# Patient Record
Sex: FEMALE | Race: AMERICAN INDIAN OR ALASKA NATIVE
[De-identification: names, ages, dates, MRNs, and addresses within clinical notes are randomized per-mention and may not be internally consistent; named-entity substitution may affect disease eponyms.]

---

## 2018-12-11 ENCOUNTER — HOSPITAL ENCOUNTER (EMERGENCY)
Dept: HOSPITAL 31 - C.ER | Age: 78
Discharge: HOME | End: 2018-12-11
Payer: MEDICARE

## 2018-12-11 VITALS
TEMPERATURE: 98.3 F | RESPIRATION RATE: 20 BRPM | DIASTOLIC BLOOD PRESSURE: 68 MMHG | SYSTOLIC BLOOD PRESSURE: 128 MMHG | HEART RATE: 96 BPM

## 2018-12-11 VITALS — BODY MASS INDEX: 28.8 KG/M2

## 2018-12-11 VITALS — OXYGEN SATURATION: 98 %

## 2018-12-11 DIAGNOSIS — N39.0: Primary | ICD-10-CM

## 2018-12-11 LAB
ALBUMIN SERPL-MCNC: 3.7 G/DL (ref 3.5–5)
ALBUMIN/GLOB SERPL: 1 {RATIO} (ref 1–2.1)
ALT SERPL-CCNC: 16 U/L (ref 9–52)
AST SERPL-CCNC: 23 U/L (ref 14–36)
BACTERIA #/AREA URNS HPF: (no result) /[HPF]
BASOPHILS # BLD AUTO: 0.1 K/UL (ref 0–0.2)
BASOPHILS NFR BLD: 0.9 % (ref 0–2)
BILIRUB UR-MCNC: NEGATIVE MG/DL
BUN SERPL-MCNC: 13 MG/DL (ref 7–17)
CALCIUM SERPL-MCNC: 8.5 MG/DL (ref 8.6–10.4)
EOSINOPHIL # BLD AUTO: 0 K/UL (ref 0–0.7)
EOSINOPHIL NFR BLD: 0 % (ref 0–4)
ERYTHROCYTE [DISTWIDTH] IN BLOOD BY AUTOMATED COUNT: 16.7 % (ref 11.5–14.5)
GFR NON-AFRICAN AMERICAN: 53
GLUCOSE UR STRIP-MCNC: NORMAL MG/DL
HGB BLD-MCNC: 10.7 G/DL (ref 11–16)
LEUKOCYTE ESTERASE UR-ACNC: (no result) LEU/UL
LIPASE: 43 U/L (ref 23–300)
LYMPHOCYTES # BLD AUTO: 2.3 K/UL (ref 1–4.3)
LYMPHOCYTES NFR BLD AUTO: 33.6 % (ref 20–40)
MCH RBC QN AUTO: 29.7 PG (ref 27–31)
MCHC RBC AUTO-ENTMCNC: 33.9 G/DL (ref 33–37)
MCV RBC AUTO: 87.7 FL (ref 81–99)
MONOCYTES # BLD: 1 K/UL (ref 0–0.8)
MONOCYTES NFR BLD: 14.3 % (ref 0–10)
NEUTROPHILS # BLD: 3.4 K/UL (ref 1.8–7)
NEUTROPHILS NFR BLD AUTO: 51.2 % (ref 50–75)
NRBC BLD AUTO-RTO: 0 % (ref 0–2)
PH UR STRIP: 5 [PH] (ref 5–8)
PLATELET # BLD: 279 K/UL (ref 130–400)
PMV BLD AUTO: 8.6 FL (ref 7.2–11.7)
PROT UR STRIP-MCNC: NEGATIVE MG/DL
RBC # BLD AUTO: 3.61 MIL/UL (ref 3.8–5.2)
RBC # UR STRIP: (no result) /UL
SP GR UR STRIP: 1.01 (ref 1–1.03)
SQUAMOUS EPITHIAL: 13 /HPF (ref 0–5)
UROBILINOGEN UR-MCNC: NORMAL MG/DL (ref 0.2–1)
WBC # BLD AUTO: 6.7 K/UL (ref 4.8–10.8)

## 2018-12-11 PROCEDURE — 83690 ASSAY OF LIPASE: CPT

## 2018-12-11 PROCEDURE — 96374 THER/PROPH/DIAG INJ IV PUSH: CPT

## 2018-12-11 PROCEDURE — 85025 COMPLETE CBC W/AUTO DIFF WBC: CPT

## 2018-12-11 PROCEDURE — 87181 SC STD AGAR DILUTION PER AGT: CPT

## 2018-12-11 PROCEDURE — 80053 COMPREHEN METABOLIC PANEL: CPT

## 2018-12-11 PROCEDURE — 99285 EMERGENCY DEPT VISIT HI MDM: CPT

## 2018-12-11 PROCEDURE — 82948 REAGENT STRIP/BLOOD GLUCOSE: CPT

## 2018-12-11 PROCEDURE — 74177 CT ABD & PELVIS W/CONTRAST: CPT

## 2018-12-11 PROCEDURE — 81001 URINALYSIS AUTO W/SCOPE: CPT

## 2018-12-11 PROCEDURE — 96375 TX/PRO/DX INJ NEW DRUG ADDON: CPT

## 2018-12-11 PROCEDURE — 87086 URINE CULTURE/COLONY COUNT: CPT

## 2018-12-11 NOTE — C.PDOC
History Of Present Illness


79 y/o female presents to ED with c/o mild diffuse abdominal pain associated 

with several episodes of non bloody diarrhea for 2 days. Patient denies recent 

travel, dysuria, fever, vomiting, hematuria or any other complaints at this t

cony. 


Chief Complaint (Nursing): Abdominal Pain


History Per: Patient


History/Exam Limitations: no limitations


Onset/Duration Of Symptoms: Days


Current Symptoms Are (Timing): Still Present





Past Medical History


Reviewed: Historical Data, Nursing Documentation, Vital Signs


Vital Signs: 





                                Last Vital Signs











Temp  99.4 F   12/11/18 09:29


 


Pulse  74   12/11/18 11:05


 


Resp  16   12/11/18 11:05


 


BP  121/51 L  12/11/18 11:05


 


Pulse Ox  98   12/11/18 11:05














- Medical History


PMH: HTN, Hypercholesterolemia, Peripheral Edema


Surgical History: No Surg Hx





- [facility] Procedures











ASSIST VAG HYSTER(LAVH) (06/12/14)


CYSTOCEL/RECTOCEL REPAIR (06/12/14)


CYSTOSCOPY NEC (06/12/14)


LAPAROSCOP REMOVE OVARIES/TUBES (06/12/14)


LAPAROSCOPIC ROBOTIC ASSISTED PROCEDURE (06/12/14)


VAGINAL SUSPENS & FIXAT (06/12/14)








Family History: States: No Known Family Hx





- Social History


Hx Alcohol Use: No


Hx Substance Use: No





- Immunization History


Hx Tetanus Toxoid Vaccination: Yes


Hx Influenza Vaccination: Yes


Hx Pneumococcal Vaccination: Yes





Review Of Systems


Constitutional: Negative for: Fever, Chills


Gastrointestinal: Positive for: Abdominal Pain, Diarrhea.  Negative for: 

Vomiting


Genitourinary: Negative for: Dysuria, Hematuria


Skin: Negative for: Rash





Physical Exam





- Physical Exam


Appears: Non-toxic, No Acute Distress


Skin: Warm, Dry, No Rash


Head: Atraumatic, Normacephalic


Eye(s): bilateral: Normal Inspection


Oral Mucosa: Moist


Neck: Normal ROM, Supple


Cardiovascular: Rhythm Regular


Respiratory: Normal Breath Sounds, No Rales, No Rhonchi, No Wheezing


Gastrointestinal/Abdominal: Soft, Tenderness (Mild diffuse), No Guarding, No 

Rebound


Back: No CVA Tenderness


Neurological/Psych: Oriented x3, Normal Speech, Normal Cognition





ED Course And Treatment





- Laboratory Results


Result Diagrams: 


                                 12/11/18 11:23





                                 12/11/18 11:23


O2 Sat by Pulse Oximetry: 98 (RA)


Pulse Ox Interpretation: Normal





Disposition





- Disposition


Referrals: 


Joaquim Donaldson MD [Medical Doctor] - 


Disposition: HOME/ ROUTINE


Disposition Time: 15:00


Condition: GOOD


Additional Instructions: 





CHARLI REILLY, thank you for letting us take care of you today. The emergency 

medical care you received today was directed at your acute symptoms. If you were

prescribed any medication, please fill it and take as directed. It may take 

several days for your symptoms to resolve. Return to the Emergency Department if

your symptoms worsen, do not improve, or if you have any other problems.





Please contact your doctor or call one of the physicians/clinics you have been 

referred to that are listed on the Patient Visit Information form that is 

included in your discharge packet. Bring any paperwork you were given at 

discharge with you along with any medications you are taking to your follow up 

visit. Our treatment cannot replace ongoing medical care by a primary care 

provider outside of the emergency department.





Thank you for allowing the [facility] team to be part of your care today.











Follow up with your primary care doctor this week for re-evaluation and further 

management.


Prescriptions: 


Sulfamethoxazole/Trimethoprim [Bactrim  mg-160 mg] 1 tab PO BID #14 tab


Instructions:  Urinary Tract Infection, Adult (DC)


Forms:  [facility] (English)





- Clinical Impression


Clinical Impression: 


 UTI (urinary tract infection)








- Scribe Statement


The provider has reviewed the documentation as recorded by the Lilianeibakash Roldan





All medical record entries made by the Scribe were at my direction and 

personally dictated by me. I have reviewed the chart and agree that the record 

accurately reflects my personal performance of the history, physical exam, 

medical decision making, and the department course for this patient. I have also

personally directed, reviewed, and agree with the discharge instructions and 

disposition.

## 2018-12-11 NOTE — CT
Date of service: 12/11/2018



PROCEDURE:  CT Abdomen and Pelvis with contrast



HISTORY:

upper abdominal pain with diarrhea



COMPARISON:

None available.



TECHNIQUE:

Contrast dose: 100 mL Visipaque 320 IV



Radiation dose:



Total exam DLP = 869.18 mGy-cm.



This CT exam was performed using one or more of the following dose 

reduction techniques: Automated exposure control, adjustment of the 

mA and/or kV according to patient size, and/or use of iterative 

reconstruction technique.



FINDINGS:



LOWER THORAX:

Mild medial right lower lobe atelectasis. No visible pleural effusion 

or pneumothorax. 



LIVER:

Too small to characterize 6 mm left hepatic lobe hypodensity; 

statistically likely cyst or hemangioma. 



GALLBLADDER AND BILE DUCTS:

Cholelithiasis. 



PANCREAS:

Fatty atrophy of the pancreas.



SPLEEN:

Indeterminate 2.5 x 3.1 cm heterogeneous mass, anterior spleen. 



ADRENALS:

Unremarkable. 



KIDNEYS AND URETERS:

The kidneys enhance symmetrically. No hydronephrosis or obstructing 

calculus identified. 



VASCULATURE:

No aortic aneurysm. Atherosclerotic calcifications of the aorta.



BOWEL:

Stomach is nondistended.  



Lack of oral contrast limits evaluation for bowel pathology.  Bowel 

loops appear within normal limits of caliber without evidence of 

obstruction.



APPENDIX:

The appendix appears within normal limits of caliber. No secondary 

signs of acute appendicitis.



PERITONEUM:

No significant free fluid.  No definite free air. 



LYMPH NODES:

Nonspecific sub cm mesenteric lymph nodes. Mild hazy infiltration of 

the mesentery. 



BLADDER:

Unremarkable. 



REPRODUCTIVE:

Unremarkable. 



BONES:

Degenerative changes of the spine. Osseous demineralization. 



OTHER FINDINGS:

None.



IMPRESSION:

Sub cm mesenteric lymph nodes. Mild hazy infiltration of the 

mesentery. Correlate clinically for mesenteric adenitis/panniculitis. 



Indeterminate 2.5 x 3.1 cm heterogeneous mass located within the 

anterior aspect of the spleen. 



Cholelithiasis. 



6 mm too small to characterize left hepatic lobe hypodensity; 

statistically likely cyst or hemangioma. 



Mild medial right lower lobe atelectasis. 



Additional findings as above.

## 2023-10-24 ENCOUNTER — NEW PATIENT COMPREHENSIVE (OUTPATIENT)
Dept: URBAN - METROPOLITAN AREA CLINIC 110 | Facility: CLINIC | Age: 83
End: 2023-10-24

## 2023-10-24 DIAGNOSIS — H35.033: ICD-10-CM

## 2023-10-24 DIAGNOSIS — H52.223: ICD-10-CM

## 2023-10-24 DIAGNOSIS — H31.422: ICD-10-CM

## 2023-10-24 DIAGNOSIS — Z96.1: ICD-10-CM

## 2023-10-24 DIAGNOSIS — H04.121: ICD-10-CM

## 2023-10-24 DIAGNOSIS — H52.4: ICD-10-CM

## 2023-10-24 DIAGNOSIS — H25.811: ICD-10-CM

## 2023-10-24 DIAGNOSIS — H52.03: ICD-10-CM

## 2023-10-24 PROCEDURE — 92250 FUNDUS PHOTOGRAPHY W/I&R: CPT

## 2023-10-24 PROCEDURE — 92004 COMPRE OPH EXAM NEW PT 1/>: CPT

## 2023-10-24 ASSESSMENT — VISUAL ACUITY
OU_SC: J1+3
OD_PH: 20/50+1
OS_CC: 20/20
OS_SC: J2
OD_CC: 20/100+1
OD_SC: J5-3

## 2023-10-24 ASSESSMENT — TONOMETRY
OS_IOP_MMHG: 10
OD_IOP_MMHG: 8

## 2023-11-14 ENCOUNTER — PRE-OP CATARACT MEASUREMENTS (OUTPATIENT)
Dept: URBAN - METROPOLITAN AREA CLINIC 110 | Facility: CLINIC | Age: 83
End: 2023-11-14

## 2023-11-14 DIAGNOSIS — H25.811: ICD-10-CM

## 2023-11-14 DIAGNOSIS — Z96.1: ICD-10-CM

## 2023-11-14 DIAGNOSIS — H43.813: ICD-10-CM

## 2023-11-14 PROCEDURE — 92134 CPTRZ OPH DX IMG PST SGM RTA: CPT

## 2023-11-14 PROCEDURE — 99214 OFFICE O/P EST MOD 30 MIN: CPT

## 2023-11-14 PROCEDURE — 92025 CPTRIZED CORNEAL TOPOGRAPHY: CPT | Mod: NC

## 2023-11-14 PROCEDURE — 76519 ECHO EXAM OF EYE: CPT | Mod: NC,LT

## 2023-11-14 ASSESSMENT — TONOMETRY
OS_IOP_MMHG: 11
OD_IOP_MMHG: 11

## 2023-11-14 ASSESSMENT — VISUAL ACUITY
OS_CC: 20/20
OD_CC: 20/100

## 2023-11-29 ENCOUNTER — SURGERY/PROCEDURE (OUTPATIENT)
Dept: URBAN - METROPOLITAN AREA SURGICAL CENTER 32 | Facility: SURGICAL CENTER | Age: 83
End: 2023-11-29

## 2023-11-29 DIAGNOSIS — H25.811: ICD-10-CM

## 2023-11-29 PROCEDURE — 66984 XCAPSL CTRC RMVL W/O ECP: CPT

## 2023-11-30 ENCOUNTER — 1 DAY POST-OP (OUTPATIENT)
Dept: URBAN - METROPOLITAN AREA CLINIC 110 | Facility: CLINIC | Age: 83
End: 2023-11-30

## 2023-11-30 DIAGNOSIS — Z96.1: ICD-10-CM

## 2023-11-30 PROCEDURE — 99024 POSTOP FOLLOW-UP VISIT: CPT

## 2023-11-30 ASSESSMENT — VISUAL ACUITY: OD_SC: 20/100-1

## 2023-11-30 ASSESSMENT — TONOMETRY: OD_IOP_MMHG: 9

## 2023-12-05 ENCOUNTER — 1 WEEK POST-OP (OUTPATIENT)
Dept: URBAN - METROPOLITAN AREA CLINIC 110 | Facility: CLINIC | Age: 83
End: 2023-12-05

## 2023-12-05 DIAGNOSIS — Z96.1: ICD-10-CM

## 2023-12-05 PROCEDURE — 99024 POSTOP FOLLOW-UP VISIT: CPT

## 2023-12-05 ASSESSMENT — TONOMETRY
OS_IOP_MMHG: 10
OD_IOP_MMHG: 8

## 2023-12-05 ASSESSMENT — VISUAL ACUITY
OS_CC: 20/20
OD_SC: 20/30-2

## 2024-01-04 ENCOUNTER — 1 MONTH POST-OP (OUTPATIENT)
Dept: URBAN - METROPOLITAN AREA CLINIC 110 | Facility: CLINIC | Age: 84
End: 2024-01-04

## 2024-01-04 DIAGNOSIS — H52.03: ICD-10-CM

## 2024-01-04 DIAGNOSIS — Z96.1: ICD-10-CM

## 2024-01-04 DIAGNOSIS — H52.4: ICD-10-CM

## 2024-01-04 PROCEDURE — 99024 POSTOP FOLLOW-UP VISIT: CPT

## 2024-01-04 PROCEDURE — 92015 DETERMINE REFRACTIVE STATE: CPT

## 2024-01-04 ASSESSMENT — TONOMETRY
OD_IOP_MMHG: 8
OS_IOP_MMHG: 10

## 2024-01-04 ASSESSMENT — VISUAL ACUITY
OD_SC: 20/40
OS_SC: 20/50-2